# Patient Record
Sex: MALE | Race: WHITE | NOT HISPANIC OR LATINO | Employment: OTHER | URBAN - METROPOLITAN AREA
[De-identification: names, ages, dates, MRNs, and addresses within clinical notes are randomized per-mention and may not be internally consistent; named-entity substitution may affect disease eponyms.]

---

## 2019-01-29 ENCOUNTER — HOSPITAL ENCOUNTER (OUTPATIENT)
Dept: MRI IMAGING | Facility: CLINIC | Age: 64
End: 2019-01-29
Attending: EMERGENCY MEDICINE
Payer: COMMERCIAL

## 2019-01-29 ENCOUNTER — APPOINTMENT (OUTPATIENT)
Dept: CT IMAGING | Facility: CLINIC | Age: 64
End: 2019-01-29
Payer: COMMERCIAL

## 2019-01-29 ENCOUNTER — HOSPITAL ENCOUNTER (EMERGENCY)
Facility: CLINIC | Age: 64
Discharge: HOME | End: 2019-01-29
Attending: EMERGENCY MEDICINE | Admitting: EMERGENCY MEDICINE
Payer: COMMERCIAL

## 2019-01-29 ENCOUNTER — TRANSFERRED RECORDS (OUTPATIENT)
Dept: HEALTH INFORMATION MANAGEMENT | Facility: CLINIC | Age: 64
End: 2019-01-29

## 2019-01-29 VITALS
SYSTOLIC BLOOD PRESSURE: 94 MMHG | OXYGEN SATURATION: 95 % | DIASTOLIC BLOOD PRESSURE: 80 MMHG | RESPIRATION RATE: 20 BRPM | TEMPERATURE: 98.4 F | HEART RATE: 103 BPM

## 2019-01-29 DIAGNOSIS — K76.9 LIVER LESION: ICD-10-CM

## 2019-01-29 DIAGNOSIS — R07.9 CHEST PAIN, UNSPECIFIED TYPE: ICD-10-CM

## 2019-01-29 DIAGNOSIS — J98.11 ATELECTASIS, LEFT: ICD-10-CM

## 2019-01-29 DIAGNOSIS — I71.20 THORACIC AORTIC ANEURYSM WITHOUT RUPTURE (H): ICD-10-CM

## 2019-01-29 LAB
ANION GAP SERPL CALCULATED.3IONS-SCNC: 10 MMOL/L (ref 3–14)
BASOPHILS # BLD AUTO: 0 10E9/L (ref 0–0.2)
BASOPHILS NFR BLD AUTO: 0.2 %
BUN SERPL-MCNC: 14 MG/DL (ref 7–30)
CALCIUM SERPL-MCNC: 8.4 MG/DL (ref 8.5–10.1)
CHLORIDE SERPL-SCNC: 106 MMOL/L (ref 94–109)
CO2 SERPL-SCNC: 24 MMOL/L (ref 20–32)
CREAT SERPL-MCNC: 1.02 MG/DL (ref 0.66–1.25)
DIFFERENTIAL METHOD BLD: ABNORMAL
EOSINOPHIL # BLD AUTO: 0 10E9/L (ref 0–0.7)
EOSINOPHIL NFR BLD AUTO: 0.3 %
ERYTHROCYTE [DISTWIDTH] IN BLOOD BY AUTOMATED COUNT: 13.3 % (ref 10–15)
GFR SERPL CREATININE-BSD FRML MDRD: 77 ML/MIN/{1.73_M2}
GLUCOSE SERPL-MCNC: 112 MG/DL (ref 70–99)
HCT VFR BLD AUTO: 46.1 % (ref 40–53)
HGB BLD-MCNC: 14.7 G/DL (ref 13.3–17.7)
IMM GRANULOCYTES # BLD: 0 10E9/L (ref 0–0.4)
IMM GRANULOCYTES NFR BLD: 0.3 %
INTERPRETATION ECG - MUSE: NORMAL
LACTATE BLD-SCNC: 1.3 MMOL/L (ref 0.7–2)
LYMPHOCYTES # BLD AUTO: 1.7 10E9/L (ref 0.8–5.3)
LYMPHOCYTES NFR BLD AUTO: 13.7 %
MCH RBC QN AUTO: 28.2 PG (ref 26.5–33)
MCHC RBC AUTO-ENTMCNC: 31.9 G/DL (ref 31.5–36.5)
MCV RBC AUTO: 88 FL (ref 78–100)
MONOCYTES # BLD AUTO: 1.5 10E9/L (ref 0–1.3)
MONOCYTES NFR BLD AUTO: 11.6 %
NEUTROPHILS # BLD AUTO: 9.2 10E9/L (ref 1.6–8.3)
NEUTROPHILS NFR BLD AUTO: 73.9 %
NRBC # BLD AUTO: 0 10*3/UL
NRBC BLD AUTO-RTO: 0 /100
PLATELET # BLD AUTO: 278 10E9/L (ref 150–450)
POTASSIUM SERPL-SCNC: 4.1 MMOL/L (ref 3.4–5.3)
RBC # BLD AUTO: 5.22 10E12/L (ref 4.4–5.9)
SODIUM SERPL-SCNC: 140 MMOL/L (ref 133–144)
TROPONIN I BLD-MCNC: 0 UG/L (ref 0–0.08)
TROPONIN I SERPL-MCNC: <0.015 UG/L (ref 0–0.04)
WBC # BLD AUTO: 12.5 10E9/L (ref 4–11)

## 2019-01-29 PROCEDURE — 74183 MRI ABD W/O CNTR FLWD CNTR: CPT

## 2019-01-29 PROCEDURE — A9585 GADOBUTROL INJECTION: HCPCS | Performed by: EMERGENCY MEDICINE

## 2019-01-29 PROCEDURE — 93005 ELECTROCARDIOGRAM TRACING: CPT

## 2019-01-29 PROCEDURE — 25000128 H RX IP 250 OP 636: Performed by: EMERGENCY MEDICINE

## 2019-01-29 PROCEDURE — 25500064 ZZH RX 255 OP 636: Performed by: EMERGENCY MEDICINE

## 2019-01-29 PROCEDURE — 80048 BASIC METABOLIC PNL TOTAL CA: CPT | Performed by: EMERGENCY MEDICINE

## 2019-01-29 PROCEDURE — 83605 ASSAY OF LACTIC ACID: CPT | Performed by: EMERGENCY MEDICINE

## 2019-01-29 PROCEDURE — 84484 ASSAY OF TROPONIN QUANT: CPT | Performed by: EMERGENCY MEDICINE

## 2019-01-29 PROCEDURE — 85025 COMPLETE CBC W/AUTO DIFF WBC: CPT | Performed by: EMERGENCY MEDICINE

## 2019-01-29 PROCEDURE — 84484 ASSAY OF TROPONIN QUANT: CPT | Mod: 91

## 2019-01-29 PROCEDURE — 99285 EMERGENCY DEPT VISIT HI MDM: CPT | Mod: 25

## 2019-01-29 PROCEDURE — 71260 CT THORAX DX C+: CPT

## 2019-01-29 PROCEDURE — 36415 COLL VENOUS BLD VENIPUNCTURE: CPT | Performed by: EMERGENCY MEDICINE

## 2019-01-29 RX ORDER — ONDANSETRON 2 MG/ML
4 INJECTION INTRAMUSCULAR; INTRAVENOUS EVERY 30 MIN PRN
Status: DISCONTINUED | OUTPATIENT
Start: 2019-01-29 | End: 2019-01-29 | Stop reason: HOSPADM

## 2019-01-29 RX ORDER — SODIUM CHLORIDE 9 MG/ML
1000 INJECTION, SOLUTION INTRAVENOUS CONTINUOUS
Status: DISCONTINUED | OUTPATIENT
Start: 2019-01-29 | End: 2019-01-29 | Stop reason: HOSPADM

## 2019-01-29 RX ORDER — IOPAMIDOL 755 MG/ML
500 INJECTION, SOLUTION INTRAVASCULAR ONCE
Status: COMPLETED | OUTPATIENT
Start: 2019-01-29 | End: 2019-01-29

## 2019-01-29 RX ORDER — MORPHINE SULFATE 4 MG/ML
4 INJECTION, SOLUTION INTRAMUSCULAR; INTRAVENOUS
Status: DISCONTINUED | OUTPATIENT
Start: 2019-01-29 | End: 2019-01-29 | Stop reason: HOSPADM

## 2019-01-29 RX ORDER — GADOBUTROL 604.72 MG/ML
10 INJECTION INTRAVENOUS ONCE
Status: COMPLETED | OUTPATIENT
Start: 2019-01-29 | End: 2019-01-29

## 2019-01-29 RX ADMIN — IOPAMIDOL 77 ML: 755 INJECTION, SOLUTION INTRAVENOUS at 11:23

## 2019-01-29 RX ADMIN — GADOBUTROL 10 ML: 604.72 INJECTION INTRAVENOUS at 15:14

## 2019-01-29 RX ADMIN — SODIUM CHLORIDE 90 ML: 9 INJECTION, SOLUTION INTRAVENOUS at 11:23

## 2019-01-29 ASSESSMENT — ENCOUNTER SYMPTOMS
NAUSEA: 0
SHORTNESS OF BREATH: 1
COUGH: 0
LIGHT-HEADEDNESS: 1

## 2019-01-29 NOTE — ED TRIAGE NOTES
Patient brought in by EMS for eval of CP. Patient states he was shoveling snow yesterday evening and developed CP at around 5pm. Patient states that he had CP and SOB all through the night. Went to the clinic today and had an abnormal EKG so was sent to ED for further eval.

## 2019-01-29 NOTE — DISCHARGE INSTRUCTIONS
Discharge Instructions    Liver lesions  Your CT scan today shows some spots in your liver.  We do not know what they are based on the CT scan alone.  He did have an MRI.  I have ordered this for an outpatient and you are scheduled for an MRI to be done at [facility] today.  Please follow-up for your MRI today.  It would be read by the radiologist overnight or by tomorrow .see your doctor in the office within the next few days to get the results of the MRI.    Thoracic aorta  Your CT scan today shows that your aorta is slightly larger than normal.  If this gets larger you have the risk that it could rupture or tear which could become life-threatening.  There is no sign of any catastrophic problem today.  It is important for you to follow-up with your doctor and have a follow-up CT scan in 6 months to make sure that your aorta is not getting bigger.  If you ever have severe chest pain, chest pain radiating through to her back, dizziness or lightheadedness or fainting, return to the ER immediately for recheck.    Chest Pain    You have been seen today for chest pain or discomfort.  At this time, your doctor has found no signs that your chest pain is due to a serious or life-threatening condition, (or you have declined more testing and/or admission to the hospital). However, sometimes there is a serious problem that does not show up right away. Your evaluation today may not be complete and you may need further testing and evaluation.     You need to follow-up with your regular doctor within 3 days.    Return to the Emergency Department if:  Your chest pain changes, gets worse, starts to happen more often, or comes with less activity.  You are short of breath.  You get very weak or tired.  You pass out or faint.  You have any new symptoms, like fever, cough, numb legs, or you cough up blood.  You have anything else that worries you.    Until you follow-up with your regular doctor please do the following:  Take one aspirin  daily unless you have an allergy or are told not to by your doctor.  If a stress test appointment has been made, go to the appointment.  If you have questions, contact your regular doctor.    If your doctor today has told you to follow-up with your regular doctor, it is very important that you make an appointment with your clinic and go to the appointment.  If you do not follow-up with your primary doctor, it may result in missing an important development which could result in permanent injury or disability and/or lasting pain.  If there is any problem keeping your appointment, call your doctor or return to the Emergency Department.    If you were given a prescription for medicine here today, be sure to read all of the information (including the package insert) that comes with your prescription.  This will include important information about the medicine, its side effects, and any warnings that you need to know about.  The pharmacist who fills the prescription can provide more information and answer questions you may have about the medicine.  If you have questions or concerns that the pharmacist cannot address, please call or return to the Emergency Department.     Opioid Medication Information    Pain medications are among the most commonly prescribed medicines, so we are including this information for all our patients. If you did not receive pain medication or get a prescription for pain medicine, you can ignore it.     You may have been given a prescription for an opioid (narcotic) pain medicine and/or have received a pain medicine while here in the Emergency Department. These medicines can make you drowsy or impaired. You must not drive, operate dangerous equipment, or engage in any other dangerous activities while taking these medications. If you drive while taking these medications, you could be arrested for DUI, or driving under the influence. Do not drink any alcohol while you are taking these medications.      Opioid pain medications can cause addiction. If you have a history of chemical dependency of any type, you are at a higher risk of becoming addicted to pain medications.  Only take these prescribed medications to treat your pain when all other options have been tried. Take it for as short a time and as few doses as possible. Store your pain pills in a secure place, as they are frequently stolen and provide a dangerous opportunity for children or visitors in your house to start abusing these powerful medications. We will not replace any lost or stolen medicine.  As soon as your pain is better, you should flush all your remaining medication.     Many prescription pain medications contain Tylenol  (acetaminophen), including Vicodin , Tylenol #3 , Norco , Lortab , and Percocet .  You should not take any extra pills of Tylenol  if you are using these prescription medications or you can get very sick.  Do not ever take more than 3000 mg of acetaminophen in any 24 hour period.    All opioids tend to cause constipation. Drink plenty of water and eat foods that have a lot of fiber, such as fruits, vegetables, prune juice, apple juice and high fiber cereal.  Take a laxative if you don?t move your bowels at least every other day. Miralax , Milk of Magnesia, Colace , or Senna  can be used to keep you regular.      Remember that you can always come back to the Emergency Department if you are not able to see your regular doctor in the amount of time listed above, if you get any new symptoms, or if there is anything that worries you.

## 2019-01-29 NOTE — ED NOTES
Bed: ED24  Expected date: 1/29/19  Expected time: 10:48 AM  Means of arrival: Ambulance  Comments:  [id number]  64yo chest pain/possible STEMI

## 2019-01-29 NOTE — ED PROVIDER NOTES
History     Chief Complaint:  Chest Pain       Ravinder Cabrera is a 63 year old male who presents to the emergency department for evaluation via EMS of chest pain. Patient states that his chest pain began yesterday several hours after he was shoveling snow, around 1700 hours. He had non-radiating burning mid-substernal chest pain and shortness of breath associated with his pain. He tried to stay home and see if it would improve on its own but as the night progressed he states that his pain was pretty bad. This morning he went in to the clinic with 6/10 pain and 12 lead EKG showed a STEMI and was brought hear via ambulance. He was given aspirin and 1 dose of nitroglycerin at the clinic, and 2 doses of nitroglycerin en route. The patient states that the first dose of nitroglycerin helped with his pain, bringing it down to 4/10, but the most recent two did not. Currently the patient reports some lightheadedness with his pain. He denies any nausea, cough or recent illness. Patient notes that his father  of PE shortly after prostate surgery.    Allergies:  No known Drug Allergies      Medications:    Medications reviewed. No current medications.      Past Medical History:    GERD  Hyperlipidemia  ADHD    Past Surgical History:    Penile surgery     Family History:    Father- PE     Social History:  Social history reviewed. No pertinent social history     Review of Systems   Respiratory: Positive for shortness of breath. Negative for cough.    Cardiovascular: Positive for chest pain.   Gastrointestinal: Negative for nausea.   Neurological: Positive for light-headedness.   All other systems reviewed and are negative.      Physical Exam   First Vitals:  BP: 116/74  Pulse: 122  Heart Rate: 123  Temp: 98.4  F (36.9  C)  Resp: 20  SpO2: 94 %      Physical Exam  Constitutional:  Appears well-developed and well-nourished. Alert. Conversant. Non toxic.  HENT:   Head: Atraumatic.   Nose: Nose normal.  Mouth/Throat: Oral  mucosa is clear and moist. no trismus. Pharynx normal. Tonsils symmetric. No tonsillar enlargement, erythema, or exudate.  Eyes: Conjunctivae normal. EOM normal. Pupils equal, round, and reactive to light. No scleral icterus.   Neck: Normal range of motion. Neck supple. No tracheal deviation present. No JVD  Cardiovascular: tachycardic- 120, regular rhythm. No gallop. No friction rub. No murmur heard. Symmetric radial and DP artery pulses   Pulmonary/Chest: Effort normal. No stridor. No respiratory distress. No wheezes. No rales. No rhonchi . No tenderness.   Abdominal: Soft. Bowel sounds normal. No distension. No mass. No tenderness. No rebound. No guarding.   Musculoskeletal:   RUE: Normal range of motion. No tenderness. No deformity  LUE: Normal range of motion. No tenderness. No deformity  RLE: Normal range of motion. No edema. No tenderness. No deformity  LLE: Normal range of motion. No edema. No tenderness. No deformity  Lymph: No cervical adenopathy.   Neurological: Alert and oriented to person, place, and time. Normal strength. CN II-VII intact. No sensory deficit. GCS eye subscore is 4. GCS verbal subscore is 5. GCS motor subscore is 6. Normal coordination   Skin: Skin is warm and dry. No rash noted. No pallor. Normal capillary refill.  Psychiatric:  Normal mood. Normal affect.     Emergency Department Course     ECG:  ECG taken at 1102, ECG read at 1118  Sinus tachycardia  CO depression  I, III, V5, V6- no stemi  Normal ECG  Rate 117 bpm. CO interval 156 ms. QRS duration 84 ms. QT/QTc 306/426 ms. P-R-T axes 45 17 21.    Compared to EKG from 1/29/19 from clinic-1014 hours  No definite change. Sinus tachycardia. Nonspecific T flattening Lead I II, III, AVF, V6. No Q waves. CO depression I, II and V6    Compared to EKG from 1/29/19 from clinic-1019 hours  No definite change. Sinus tachycardia- 128. Nonspecific T flattening Lead I II, III, AVF, V6. No Q waves. CO depression I, II and V6    Compared to EKG from  1/29/19 from clinic-1038 hours  No definite change. Sinus tachycardia. Computer suggests ST elevation but findings are nonspecific. T flattening Lead II, III, AVF. No Q waves. CA depression I, II and V6    Compared to EKG from 1/29/19 from clinic-1040 hours  No definite change. Sinus tachycardia. Computer suggests ST elevation but findings are nonspecific. T wave flattening Lead II, III, AVF. No Q waves. CA depression I, II and V6    Compared to EKG from 1/29/19 from clinic-1057 hours  No definite change. Sinus tachycardia. ST elevation but findings are nonspecific. T wave flattening Lead I, II, III, AVF, V6. No Q waves. CA depression I, II and V6        Imaging:  Radiology findings were communicated with the patient who voiced understanding of the findings.      CT Chest Pulmonary Embolism w Contrast  Final Result  IMPRESSION:  1. No acute pulmonary embolus or thoracic aortic dissection.  2. Tortuous mildly aneurysmal thoracic aorta as described.  3. No acute-appearing pulmonary disease.  4. Multiple hypodense liver lesions, suspect that most are cysts,  although some are indeterminate up to 1.5 cm in size. Further  evaluation with nonurgent MRI may be helpful for clarification.  SURESH MORA MD    Reading per radiology     Laboratory:  Laboratory findings were communicated with the patient who voiced understanding of the findings.    CBC: WBC 12.5 (H), HGB 14.7,   BMP: glucose 112 (H), Ca 8.4 (L), o/w WNL (Creatinine 1.02)  Lactic acid: 1.3  Troponin (Collected 1110): <0.015  Troponin POCT: 0     Interventions:  NS 1L IV Bolus     Emergency Department Course:  Nursing notes and vitals reviewed.  I performed an exam of the patient as documented above.   1349 patient recheck- the patients pain is improved  I discussed the treatment plan with the patient. They expressed understanding of this plan and consented to discharge. They will be discharged home with instructions for care and follow up. In addition,  the patient will return to the emergency department if their symptoms persist, worsen, if new symptoms arise or if there is any concern.  All questions were answered.     I personally reviewed the imaging and lab results with the Patient and answered all related questions prior to discharge.  Impression & Plan      Medical Decision Making:  Ravinder Cabrera is a 63 year old malewho presents to the ER today for evaluation of chest pain. Differential was broad. No evidence of palpitations, syncope or other cardiac dysrhythmia.     We considered possible ACS, however history is not typical for that as he actually shoveled driveway yesterday afternoon without any chest pain and then a few hours later began having nonexertional chest pain meds but present continuously overnight and continued this morning until he was in clinic and continued here in the ER.  His chest pain did go from a 6 to a 4 after the first sublingual nitro given in clinic but had no further improvement with the next 2 subsequent nitroglycerin tablets.  He had an EKG in clinic that showed nonspecific changes and then EKGs per paramedics for which the computer interpretation suggest an acute lateral MI.  However, the paramedics did not feel that it represented acute MI, so they transported here rather than to [facility].  I reviewed the patient's EKG from clinic as well as EKGs from the paramedics and I agree that they do not represent acute ST elevation MI.  If anything I think computer may have been noticing some artifact in lead I and some TN depression. Workup with EKG and troponin is negative.  Given time since onset of symptoms, I do not think the patient needs to be admitted for further sets of enzymes.  I discussed with the patient that overall history does not really suggest ACS but that we could admit for further observation.  He would rather go home.  Overall think that is reasonable as my pretest probability of acute non-ST elevation ACS  is low.  I will set the patient up for close outpatient stress test.  I gave careful return precautions as well.    EKG shows no evidence for pericarditis. Clinical presentation on suggestive of myocarditis.    Patient did have recent surgery, about a month ago and was tachycardic so we did consider PE.  He also had a family history of a father who  from a PE.  I did not feel he was low enough risk to rule out by d-dimer testing so we sent him for CTPA.  It is negative for PE and also negative for aortic dissection.  It shows no evidence for pneumonia, pneumothorax, pulmonary edema, pleural effusion, rib fracture, cardiomegaly.     CT scan does show a thoracic aortic aneurysm, which I discussed with radiologist.  She reviewed the images and there is no evidence for any dissection change or intramural thrombus.  No evidence for pericardial effusion.  I discussed this with the patient and he understands the need for follow-up but at this point is not likely related to his pain.    The CT scan also showed a left lung atelectasis.  Patient does have a leukocytosis but no other cough or fever or other respiratory symptoms which would correlate with a pneumonia so we will not start any antibiotics.    CT scan also showed multiple nonspecific liver lesions.  MRI to further characterize is recommended.  I think this can be done as an outpatient I have ordered this for the patient.  It should be able to be done later today.    CT scan also showed nonspecific thickening of the inferior portion of the left diaphragm.  I discussed this with radiologist and the etiology is unclear.  Radiologist does not think it represents malignancy and cannot recommend a specific plan for follow-up.  Patient has a 1 prior CT scan from years ago when he was in [place].  I asked him to try to get those results of that we can see if this finding was present before, if not he will need a follow-up CT scan in 6-12 months to make sure this does  not get worse.  Patient understands.    Overall the precise cause for his chest pain is not clear.  It was nonexertional in nature and has been continuous now for over 18 hours.  Not progressing or worsening, and in fact has improved dramatically after pain meds given here in the ER.  Could be musculoskeletal after his shoveling yesterday.  Could also be esophageal.  No evidence for chest wall hematoma, cellulitis, shingles.    Written copies of his test and CT results were given to the patient so that he will have easy means to remember all of the required follow-up including the thoracic aorta, left diaphragm, liver.  Additionally outpatient stress test has been ordered.  Diagnosis:    ICD-10-CM    1. Liver lesion K76.9 MRI Abdomen w & w/o contrast*   2. Chest pain, unspecified type R07.9 Echo Stress Echocardiogram   3. Thoracic aortic aneurysm without rupture (H) I71.2    4. Atelectasis, left J98.11      Disposition:   Discharged     Discharge Medications:     Review of your medicines      You have not been prescribed any medications.         Scribe Disclosure:  Ciaran CARDONA, am serving as a scribe at 11:20 AM on 1/29/2019 to document services personally performed by Kristofer Kerns MD, based on my observations and the provider's statements to me.     [facility] EMERGENCY DEPARTMENT       Kristofer Kerns MD  01/29/19 [id number]

## 2019-01-29 NOTE — ED AVS SNAPSHOT
[facility] Emergency Department  [address]  [facility] [address]  Phone:  [phone]  Fax:  [phone]                                    Ravinder Cabrera   MRN: [id number]    Department:  [facility] Emergency Department   Date of Visit:  1/29/2019           After Visit Summary Signature Page    I have received my discharge instructions, and my questions have been answered. I have discussed any challenges I see with this plan with the nurse or doctor.    ..........................................................................................................................................  Patient/Patient Representative Signature      ..........................................................................................................................................  Patient Representative Print Name and Relationship to Patient    ..................................................               ................................................  Date                                   Time    ..........................................................................................................................................  Reviewed by Signature/Title    ...................................................              ..............................................  Date                                               Time          22EPIC Rev 08/18

## 2019-02-08 ENCOUNTER — HOSPITAL ENCOUNTER (OUTPATIENT)
Dept: CARDIOLOGY | Facility: CLINIC | Age: 64
Discharge: HOME | End: 2019-02-08
Attending: EMERGENCY MEDICINE | Admitting: EMERGENCY MEDICINE
Payer: COMMERCIAL

## 2019-02-08 DIAGNOSIS — R07.9 CHEST PAIN, UNSPECIFIED TYPE: ICD-10-CM

## 2019-02-08 PROCEDURE — 93018 CV STRESS TEST I&R ONLY: CPT | Performed by: INTERNAL MEDICINE

## 2019-02-08 PROCEDURE — 93321 DOPPLER ECHO F-UP/LMTD STD: CPT | Mod: 26 | Performed by: INTERNAL MEDICINE

## 2019-02-08 PROCEDURE — 93350 STRESS TTE ONLY: CPT | Mod: TC

## 2019-02-08 PROCEDURE — 93350 STRESS TTE ONLY: CPT | Mod: 26 | Performed by: INTERNAL MEDICINE

## 2019-02-08 PROCEDURE — 93325 DOPPLER ECHO COLOR FLOW MAPG: CPT | Mod: 26 | Performed by: INTERNAL MEDICINE

## 2019-02-08 PROCEDURE — 93016 CV STRESS TEST SUPVJ ONLY: CPT | Performed by: INTERNAL MEDICINE

## 2020-06-23 ENCOUNTER — VIRTUAL VISIT (OUTPATIENT)
Dept: FAMILY MEDICINE | Facility: CLINIC | Age: 65
End: 2020-06-23
Payer: COMMERCIAL

## 2020-06-23 ENCOUNTER — MYC MEDICAL ADVICE (OUTPATIENT)
Dept: FAMILY MEDICINE | Facility: CLINIC | Age: 65
End: 2020-06-23

## 2020-06-23 DIAGNOSIS — F90.9 ATTENTION DEFICIT HYPERACTIVITY DISORDER (ADHD), UNSPECIFIED ADHD TYPE: ICD-10-CM

## 2020-06-23 DIAGNOSIS — Z00.00 ENCOUNTER FOR MEDICARE ANNUAL WELLNESS EXAM: Primary | ICD-10-CM

## 2020-06-23 DIAGNOSIS — F90.9 ATTENTION DEFICIT HYPERACTIVITY DISORDER (ADHD), UNSPECIFIED ADHD TYPE: Primary | ICD-10-CM

## 2020-06-23 DIAGNOSIS — K42.9 UMBILICAL HERNIA WITHOUT OBSTRUCTION AND WITHOUT GANGRENE: ICD-10-CM

## 2020-06-23 DIAGNOSIS — E78.5 HYPERLIPIDEMIA LDL GOAL <100: ICD-10-CM

## 2020-06-23 PROCEDURE — 99204 OFFICE O/P NEW MOD 45 MIN: CPT | Mod: 95 | Performed by: PHYSICIAN ASSISTANT

## 2020-06-23 RX ORDER — ATORVASTATIN CALCIUM 10 MG/1
10 TABLET, FILM COATED ORAL DAILY
COMMUNITY
Start: 2019-01-19 | End: 2020-06-23

## 2020-06-23 RX ORDER — ATORVASTATIN CALCIUM 10 MG/1
10 TABLET, FILM COATED ORAL DAILY
Qty: 90 TABLET | Refills: 1 | Status: SHIPPED | OUTPATIENT
Start: 2020-06-23

## 2020-06-23 RX ORDER — METHYLPHENIDATE HYDROCHLORIDE 36 MG/1
36 TABLET ORAL DAILY
Qty: 30 TABLET | Refills: 0 | Status: SHIPPED | OUTPATIENT
Start: 2020-08-24 | End: 2020-06-23

## 2020-06-23 RX ORDER — METHYLPHENIDATE HYDROCHLORIDE 36 MG/1
36 TABLET ORAL DAILY
Qty: 30 TABLET | Refills: 0 | Status: SHIPPED | OUTPATIENT
Start: 2020-06-23 | End: 2020-06-23

## 2020-06-23 RX ORDER — METHYLPHENIDATE HYDROCHLORIDE 36 MG/1
36 TABLET ORAL DAILY
Qty: 30 TABLET | Refills: 0 | Status: SHIPPED | OUTPATIENT
Start: 2020-07-24 | End: 2020-06-23

## 2020-06-23 RX ORDER — ASPIRIN 81 MG/1
81 TABLET, CHEWABLE ORAL DAILY
COMMUNITY
End: 2020-06-23

## 2020-06-23 RX ORDER — METHYLPHENIDATE HYDROCHLORIDE 20 MG/1
20 CAPSULE, EXTENDED RELEASE ORAL DAILY
Qty: 30 CAPSULE | Refills: 0 | Status: SHIPPED | OUTPATIENT
Start: 2020-06-23 | End: 2020-08-06

## 2020-06-23 RX ORDER — METHYLPHENIDATE HYDROCHLORIDE 36 MG/1
36 TABLET ORAL DAILY
COMMUNITY
Start: 2016-10-02 | End: 2020-06-23

## 2020-06-23 SDOH — HEALTH STABILITY: MENTAL HEALTH: HOW OFTEN DO YOU HAVE A DRINK CONTAINING ALCOHOL?: NEVER

## 2020-06-23 NOTE — PATIENT INSTRUCTIONS
Preventive Health Recommendations  See your health care provider every year to    Review health changes.     Discuss preventive care.      Review your medicines if your doctor has prescribed any.    Talk with your health care provider about whether you should have a test to screen for prostate cancer (PSA).    Every 3 years, have a diabetes test (fasting glucose). If you are at risk for diabetes, you should have this test more often.    Every 5 years, have a cholesterol test. Have this test more often if you are at risk for high cholesterol or heart disease.     Every 10 years, have a colonoscopy. Or, have a yearly FIT test (stool test). These exams will check for colon cancer.    Talk to with your health care provider about screening for Abdominal Aortic Aneurysm if you have a family history of AAA or have a history of smoking.    Shots:     Get a flu shot each year.     Get a tetanus shot every 10 years.     Talk to your doctor about your pneumonia vaccines. There are now two you should receive - Pneumovax (PPSV 23) and Prevnar (PCV 13).    Talk to your pharmacist about a shingles vaccine.     Talk to your doctor about the hepatitis B vaccine.    Nutrition:     Eat at least 5 servings of fruits and vegetables each day.     Eat whole-grain bread, whole-wheat pasta and brown rice instead of white grains and rice.     Get adequate Calcium and Vitamin D.     Lifestyle    Exercise for at least 150 minutes a week (30 minutes a day, 5 days a week). This will help you control your weight and prevent disease.     Limit alcohol to one drink per day.     No smoking.     Wear sunscreen to prevent skin cancer.     See your dentist every six months for an exam and cleaning.     See your eye doctor every 1 to 2 years to screen for conditions such as glaucoma, macular degeneration and cataracts.    Personalized Prevention Plan  You are due for the preventive services outlined below.  Your care team is available to assist you in  scheduling these services.  If you have already completed any of these items, please share that information with your care team to update in your medical record.  Health Maintenance Due   Topic Date Due     Hepatitis C Screening  1955     Discuss Advance Care Planning  1955     Colorectal Cancer Screening  09/30/1963     Diptheria Tetanus Pertussis (DTAP/TDAP/TD) Vaccine (1 - Tdap) 09/30/1978     Cholesterol Lab  09/30/1988     Annual Wellness Visit  09/30/2018     FALL RISK ASSESSMENT  09/30/2018     Pneumococcal Vaccine (1 of 2 - PCV13) 09/30/2018     PHQ-2  01/01/2020     Patient Education   Personalized Prevention Plan  You are due for the preventive services outlined below.  Your care team is available to assist you in scheduling these services.  If you have already completed any of these items, please share that information with your care team to update in your medical record.  Health Maintenance Due   Topic Date Due     Hepatitis C Screening  1955     Discuss Advance Care Planning  1955     Colorectal Cancer Screening  09/30/1963     Diptheria Tetanus Pertussis (DTAP/TDAP/TD) Vaccine (1 - Tdap) 09/30/1978     Cholesterol Lab  09/30/1988     Annual Wellness Visit  09/30/2018     FALL RISK ASSESSMENT  09/30/2018     Pneumococcal Vaccine (1 of 2 - PCV13) 09/30/2018     PHQ-2  01/01/2020

## 2020-06-23 NOTE — PROGRESS NOTES
"Ravinder Cabrera is a 64 year old male who is being evaluated via a billable video visit.      The patient has been notified of following:     \"This video visit will be conducted via a call between you and your physician/provider. We have found that certain health care needs can be provided without the need for an in-person physical exam.  This service lets us provide the care you need with a video conversation.  If a prescription is necessary we can send it directly to your pharmacy.  If lab work is needed we can place an order for that and you can then stop by our lab to have the test done at a later time.    Video visits are billed at different rates depending on your insurance coverage.  Please reach out to your insurance provider with any questions.    If during the course of the call the physician/provider feels a video visit is not appropriate, you will not be charged for this service.\"    Patient has given verbal consent for Video visit? Yes    Will anyone else be joining your video visit? No      Subjective     Ravinder Cabrera is a 64 year old male who presents today via video visit for the following health issues:    HPI  Annual Wellness Visit    Are you in the first 12 months of your Medicare Part B coverage?  Yes, unable to do vision test due to virtual visit.     Physical Health:    In general, how would you rate your overall physical health? good    Outside of work, how many days during the week do you exercise?4-5 days/week    Outside of work, approximately how many minutes a day do you exercise?greater than 60 minutes    If you drink alcohol do you typically have >3 drinks per day or >7 drinks per week? No    Do you usually eat at least 4 servings of fruit and vegetables a day, include whole grains & fiber and avoid regularly eating high fat or \"junk\" foods? NO - eating 2-3 servings per day     Do you have any problems taking medications regularly? No    Do you have any side effects from " medications? none    Needs assistance for the following daily activities: no assistance needed    Which of the following safety concerns are present in your home?  none identified     Hearing impairment: Yes, perforated ear drum on left side     In the past 6 months, have you been bothered by leaking of urine? no    Mental Health:    In general, how would you rate your overall mental or emotional health? good  PHQ-2 Score:      Ravinder Cabrera is a 64 year old male who presents today for video visit to establish care and review medications  He has gained some weight during coronavirus    Has an umbilical hernia for many years  Repair put on hold due to the COVID-19 public health crisis    [facility] colonoscopy in ;  All clear for 10 years    Last PSA in fall 2018  Father had PCa; does think it was advanced    Hepatitis C screening negative    Has not had P23 vaccine    Up to date on colonoscopy    Do you feel safe in your environment? Yes    Have you ever done Advance Care Planning? (For example, a Health Directive, POLST, or a discussion with a medical provider or your loved ones about your wishes)? No, advance care planning information given to patient to review.  Advanced care planning was discussed at today's visit.    Fall risk:  Fallen 2 or more times in the past year?: No  Any fall with injury in the past year?: No    Cognitive Screenin) Repeat 3 items (Leader, Season, Table)    2) Clock draw: NORMAL  3) 3 item recall: Recalls 3 objects  Results: 3 items recalled: COGNITIVE IMPAIRMENT LESS LIKELY    Mini-CogTM Copyright S Freeman. Licensed by the author for use in [facility]; reprinted with permission (jenny@.[facility]). All rights reserved.      Do you have sleep apnea, excessive snoring or daytime drowsiness?: no    Current providers sharing in care for this patient include:   Patient Care Team:  Roberto Duenas PA-C as PCP - General (Physician Assistant)    Video Start Time:  950am    There is no problem list on file for this patient.    Past Surgical History:   Procedure Laterality Date     C INSERT SEMI-RIGID PENILE PROSTHESIS       TONSILLECTOMY         Social History     Tobacco Use     Smoking status: Never Smoker     Smokeless tobacco: Never Used   Substance Use Topics     Alcohol use: Not Currently     Frequency: Never     Comment: prior overuse     Family History   Problem Relation Age of Onset     Ovarian Cancer Mother      Prostate Cancer Father            Reviewed and updated as needed this visit by Provider  Tobacco  Soc Hx        Review of Systems   Constitutional, HEENT, cardiovascular, pulmonary, gi and gu systems are negative, except as otherwise noted.      Objective    There were no vitals taken for this visit.  There is no height or weight on file to calculate BMI.  Physical Exam     GENERAL: Healthy, alert and no distress  EYES: Eyes grossly normal to inspection.  No discharge or erythema, or obvious scleral/conjunctival abnormalities.  RESP: No audible wheeze, cough, or visible cyanosis.  No visible retractions or increased work of breathing.    SKIN: Visible skin clear. No significant rash, abnormal pigmentation or lesions.  NEURO: Cranial nerves grossly intact.  Mentation and speech appropriate for age.  PSYCH: Mentation appears normal, affect normal/bright, judgement and insight intact, normal speech and appearance well-groomed.      Diagnostic Test Results:  Labs reviewed in Epic        Assessment & Plan     1. Encounter for Medicare annual wellness exam  Reviewed personal and family history. Reviewed age appropriate screenings. Recommended any needed vaccinations. Consider P23. Will need updated PSA next labs. Up to date on colonoscopy.    2. Umbilical hernia without obstruction and without gangrene  Postponed 2/2 covid. Had already met with surgeon prviously. Will refer without exam  - GENERAL SURG ADULT REFERRAL; Future    3. Attention deficit hyperactivity  disorder (ADHD), unspecified ADHD type  Dx many years ago by specialty. Previously on 72mg but doing well on current dose. Tolerates. Refilling.   - methylphenidate (CONCERTA) 36 MG CR tablet; Take 1 tablet (36 mg) by mouth daily  Dispense: 30 tablet; Refill: 0  - methylphenidate (CONCERTA) 36 MG CR tablet; Take 1 tablet (36 mg) by mouth daily  Dispense: 30 tablet; Refill: 0  - methylphenidate (CONCERTA) 36 MG CR tablet; Take 1 tablet (36 mg) by mouth daily  Dispense: 30 tablet; Refill: 0    4. Hyperlipidemia LDL goal <100  Needs updated labs. For now will refer.   - atorvastatin (LIPITOR) 10 MG tablet; Take 1 tablet (10 mg) by mouth daily  Dispense: 90 tablet; Refill: 1       Return in about 4 months (around 10/23/2020) for labs and in clinic check up.    Roberto Duenas PA-C  [facility]      Video-Visit Details    Type of service:  Video Visit    Video End Time:1025am    Originating Location (pt. Location): Home    Distant Location (provider location):  [facility]     Platform used for Video Visit: Clarke    Return in about 4 months (around 10/23/2020) for labs and in clinic check up.       Roberto Duenas PA-C

## 2020-06-26 ENCOUNTER — TELEPHONE (OUTPATIENT)
Dept: SURGERY | Facility: CLINIC | Age: 65
End: 2020-06-26

## 2020-06-26 NOTE — TELEPHONE ENCOUNTER
Referral received from Roberto Duenas for umbilical hernia    Attempt #1:    Called patient at [phone] (home)  .  No answer - left message for patient to return call to clinic [phone].

## 2020-07-22 ENCOUNTER — MYC MEDICAL ADVICE (OUTPATIENT)
Dept: FAMILY MEDICINE | Facility: CLINIC | Age: 65
End: 2020-07-22

## 2020-07-22 DIAGNOSIS — F90.9 ATTENTION DEFICIT HYPERACTIVITY DISORDER (ADHD), UNSPECIFIED ADHD TYPE: Primary | ICD-10-CM

## 2020-07-22 RX ORDER — METHYLPHENIDATE HYDROCHLORIDE 10 MG/1
10 TABLET ORAL DAILY
Qty: 30 TABLET | Refills: 0 | Status: SHIPPED | OUTPATIENT
Start: 2020-07-22 | End: 2020-08-06

## 2020-08-06 ENCOUNTER — MYC MEDICAL ADVICE (OUTPATIENT)
Dept: FAMILY MEDICINE | Facility: CLINIC | Age: 65
End: 2020-08-06

## 2020-08-06 DIAGNOSIS — F90.9 ATTENTION DEFICIT HYPERACTIVITY DISORDER (ADHD), UNSPECIFIED ADHD TYPE: ICD-10-CM

## 2020-08-06 RX ORDER — METHYLPHENIDATE HYDROCHLORIDE 10 MG/1
10 TABLET ORAL DAILY
Qty: 45 TABLET | Refills: 0 | Status: SHIPPED | OUTPATIENT
Start: 2020-08-06 | End: 2020-09-04

## 2020-08-24 ENCOUNTER — MYC MEDICAL ADVICE (OUTPATIENT)
Dept: FAMILY MEDICINE | Facility: CLINIC | Age: 65
End: 2020-08-24

## 2020-08-24 NOTE — LETTER
[facility]  [address] [address] [address]  Phone: [phone]    August 31, 2020        Ravinder Cabrera  [address] [address] [address]          To whom it may concern:    RE: Ravinder Cabrera    This patient is seen and treated at our clinic for general medical care. He takes methylphenidate to control a diagnosis of ADHD. He takes between 10-20mg daily and tolerates this medication well. He has no adverse effects and can safely use this medication in his profession.    Please contact me for questions or concerns.      Sincerely,        Roberto Duenas PA-C

## 2020-08-31 NOTE — TELEPHONE ENCOUNTER
Faxed to [facility], Dr. Boswell at [phone]. Patient notified that the letter was faxed.  -Clau Kim

## 2020-09-04 ENCOUNTER — MYC REFILL (OUTPATIENT)
Dept: FAMILY MEDICINE | Facility: CLINIC | Age: 65
End: 2020-09-04

## 2020-09-04 DIAGNOSIS — F90.9 ATTENTION DEFICIT HYPERACTIVITY DISORDER (ADHD), UNSPECIFIED ADHD TYPE: ICD-10-CM

## 2020-09-04 RX ORDER — METHYLPHENIDATE HYDROCHLORIDE 10 MG/1
10 TABLET ORAL DAILY
Qty: 45 TABLET | Refills: 0 | Status: SHIPPED | OUTPATIENT
Start: 2020-09-04

## 2020-09-04 NOTE — TELEPHONE ENCOUNTER
methylphenidate (RITALIN) 10 MG tablet   Last Written Prescription Date:  8/6/20  Last Fill Quantity: 45,   # refills: 0  Last Office Visit: 6/23/20  Future Office visit:       Routing refill request to provider for review/approval because:  Drug not on the [facility], [facility] or [facility] refill protocol or controlled substance    :  8/6/20, 7/22/20, 6/23/20    Carolina Mason RN on 9/4/2020 at 3:57 PM

## 2020-11-14 ENCOUNTER — HEALTH MAINTENANCE LETTER (OUTPATIENT)
Age: 65
End: 2020-11-14

## 2021-02-23 ENCOUNTER — VIRTUAL VISIT (OUTPATIENT)
Dept: FAMILY MEDICINE | Facility: CLINIC | Age: 66
End: 2021-02-23
Payer: COMMERCIAL

## 2021-02-23 DIAGNOSIS — F90.9 ATTENTION DEFICIT HYPERACTIVITY DISORDER (ADHD), UNSPECIFIED ADHD TYPE: Primary | ICD-10-CM

## 2021-02-23 PROCEDURE — 99213 OFFICE O/P EST LOW 20 MIN: CPT | Mod: 95 | Performed by: PHYSICIAN ASSISTANT

## 2021-02-23 RX ORDER — METHYLPHENIDATE HYDROCHLORIDE 10 MG/1
10 TABLET ORAL DAILY
Qty: 30 TABLET | Refills: 0 | Status: SHIPPED | OUTPATIENT
Start: 2021-04-26 | End: 2021-05-26

## 2021-02-23 RX ORDER — METHYLPHENIDATE HYDROCHLORIDE 10 MG/1
10 TABLET ORAL DAILY
Qty: 30 TABLET | Refills: 0 | Status: SHIPPED | OUTPATIENT
Start: 2021-03-26 | End: 2021-04-25

## 2021-02-23 RX ORDER — METHYLPHENIDATE HYDROCHLORIDE 10 MG/1
10 TABLET ORAL DAILY
Qty: 30 TABLET | Refills: 0 | Status: SHIPPED | OUTPATIENT
Start: 2021-02-23 | End: 2021-03-25

## 2021-02-23 NOTE — PROGRESS NOTES
Ravinder is a 65 year old who is being evaluated via a billable video visit.      How would you like to obtain your AVS? MyChart  If the video visit is dropped, the invitation should be resent by: Text to cell phone: [phone]  Will anyone else be joining your video visit? No    Video Start Time: 1:14 PM    Assessment & Plan     Attention deficit hyperactivity disorder (ADHD), unspecified ADHD type  He did tolerated the SA previously and prefers this 2/2 cost. Reviewed r/b/se of SA vs LA and he does prefer. Ok to continue. Follow-up 6 months. AT THE END OF THIS VISIT THE PATIENT INFORMED ME HIS PHARMACY WAS IN [place], but I was unaware of this during the visit.   - methylphenidate (RITALIN) 10 MG tablet; Take 1 tablet (10 mg) by mouth daily  - methylphenidate (RITALIN) 10 MG tablet; Take 1 tablet (10 mg) by mouth daily  - methylphenidate (RITALIN) 10 MG tablet; Take 1 tablet (10 mg) by mouth daily    Return in about 6 months (around 8/23/2021).    Roberto Duenas PA-C  [facility]   Ravinder is a 65 year old who presents for the following health issues     HPI     Reestablishing Care    Medication Followup of Ritalin    Taking Medication as prescribed: NO-Most recently prescribed Concerta instead of Ritalin    Side Effects:  None    Medication Helping Symptoms:  yes   Requesting to get refill on Ritalin.     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      Objective           Vitals:  No vitals were obtained today due to virtual visit.    Physical Exam   GENERAL: Healthy, alert and no distress  EYES: Eyes grossly normal to inspection.  No discharge or erythema, or obvious scleral/conjunctival abnormalities.  RESP: No audible wheeze, cough, or visible cyanosis.  No visible retractions or increased work of breathing.    SKIN: Visible skin clear. No significant rash, abnormal pigmentation or lesions.  NEURO: Cranial nerves grossly intact.  Mentation and speech appropriate for age.  PSYCH: Mentation appears normal, affect normal/bright, judgement and insight intact, normal speech and appearance well-groomed.          Video-Visit Details    Type of service:  Video Visit    Video End Time:1:28 PM    Originating Location (pt. Location): Home    Distant Location (provider location):  [facility]     Platform used for Video Visit: ARKeX

## 2021-05-26 ENCOUNTER — MYC MEDICAL ADVICE (OUTPATIENT)
Dept: FAMILY MEDICINE | Facility: CLINIC | Age: 66
End: 2021-05-26

## 2021-05-26 DIAGNOSIS — F90.9 ATTENTION DEFICIT HYPERACTIVITY DISORDER (ADHD), UNSPECIFIED ADHD TYPE: ICD-10-CM

## 2021-05-26 RX ORDER — METHYLPHENIDATE HYDROCHLORIDE 10 MG/1
10 TABLET ORAL DAILY
Qty: 30 TABLET | Refills: 0 | Status: SHIPPED | OUTPATIENT
Start: 2021-05-26

## 2021-05-26 NOTE — TELEPHONE ENCOUNTER
methylphenidate (RITALIN) 10 MG tablet      Last Written Prescription Date:  4/26/2021  Last Fill Quantity: 30,   # refills: 0  Last Office Visit: 2/23/2021  (VIRTUAL VISIT)  Future Office visit:       Routing refill request to provider for review/approval because:  Drug not on the [facility] or [facility] refill protocol or controlled substance.    Saima Aly RN

## 2021-09-12 ENCOUNTER — HEALTH MAINTENANCE LETTER (OUTPATIENT)
Age: 66
End: 2021-09-12

## 2022-11-19 ENCOUNTER — HEALTH MAINTENANCE LETTER (OUTPATIENT)
Age: 67
End: 2022-11-19

## 2024-01-27 ENCOUNTER — HEALTH MAINTENANCE LETTER (OUTPATIENT)
Age: 69
End: 2024-01-27